# Patient Record
Sex: FEMALE | Race: OTHER | URBAN - METROPOLITAN AREA
[De-identification: names, ages, dates, MRNs, and addresses within clinical notes are randomized per-mention and may not be internally consistent; named-entity substitution may affect disease eponyms.]

---

## 2022-08-09 ENCOUNTER — TELEPHONE (OUTPATIENT)
Dept: FAMILY MEDICINE CLINIC | Facility: CLINIC | Age: 22
End: 2022-08-09

## 2022-08-09 NOTE — TELEPHONE ENCOUNTER
Patients mother calling to make a new patient appointment for gynecologicall visit with Dr. Mara Barrientos for tomorrow, she was told by Dr. Ruiz Mcneal was okay, could not verify with  at time of call, please advise.

## 2022-08-10 ENCOUNTER — OFFICE VISIT (OUTPATIENT)
Dept: FAMILY MEDICINE CLINIC | Facility: CLINIC | Age: 22
End: 2022-08-10
Payer: COMMERCIAL

## 2022-08-10 VITALS
WEIGHT: 129 LBS | RESPIRATION RATE: 18 BRPM | SYSTOLIC BLOOD PRESSURE: 102 MMHG | HEIGHT: 64 IN | HEART RATE: 97 BPM | BODY MASS INDEX: 22.02 KG/M2 | DIASTOLIC BLOOD PRESSURE: 66 MMHG | OXYGEN SATURATION: 99 %

## 2022-08-10 DIAGNOSIS — N89.8 VAGINAL DISCHARGE: Primary | ICD-10-CM

## 2022-08-10 LAB
APPEARANCE: CLEAR
BILIRUBIN: NEGATIVE
GLUCOSE (URINE DIPSTICK): NEGATIVE MG/DL
KETONES (URINE DIPSTICK): NEGATIVE MG/DL
LEUKOCYTES: NEGATIVE
MULTISTIX LOT#: ABNORMAL NUMERIC
NITRITE, URINE: NEGATIVE
PH, URINE: 5.5 (ref 4.5–8)
PROTEIN (URINE DIPSTICK): NEGATIVE MG/DL
SPECIFIC GRAVITY: 1.03 (ref 1–1.03)
URINE-COLOR: YELLOW
UROBILINOGEN,SEMI-QN: 0.2 MG/DL (ref 0–1.9)

## 2022-08-10 PROCEDURE — 81002 URINALYSIS NONAUTO W/O SCOPE: CPT | Performed by: FAMILY MEDICINE

## 2022-08-10 PROCEDURE — 3074F SYST BP LT 130 MM HG: CPT | Performed by: FAMILY MEDICINE

## 2022-08-10 PROCEDURE — 99213 OFFICE O/P EST LOW 20 MIN: CPT | Performed by: FAMILY MEDICINE

## 2022-08-10 PROCEDURE — 3008F BODY MASS INDEX DOCD: CPT | Performed by: FAMILY MEDICINE

## 2022-08-10 PROCEDURE — 3078F DIAST BP <80 MM HG: CPT | Performed by: FAMILY MEDICINE

## 2022-08-10 RX ORDER — DROSPIRENONE AND ETHINYL ESTRADIOL 0.02-3(28)
KIT ORAL
COMMUNITY

## 2022-08-11 LAB
APPEARANCE: CLEAR
BILIRUBIN: NEGATIVE
COLOR: YELLOW
GLUCOSE: NEGATIVE
KETONES: NEGATIVE
LEUKOCYTE ESTERASE: NEGATIVE
NITRITE: NEGATIVE
OCCULT BLOOD: NEGATIVE
PH: 5.5 (ref 5–8)
PROTEIN: NEGATIVE
SPECIFIC GRAVITY: 1.02 (ref 1–1.03)

## 2022-08-13 LAB
Lab: NOT DETECTED
Lab: NOT DETECTED
U. PARVUM DNA: NOT DETECTED
U. UREALYTICUM DNA: NOT DETECTED

## 2024-12-23 ENCOUNTER — LAB ENCOUNTER (OUTPATIENT)
Dept: LAB | Age: 24
End: 2024-12-23
Attending: FAMILY MEDICINE
Payer: COMMERCIAL

## 2024-12-23 ENCOUNTER — OFFICE VISIT (OUTPATIENT)
Dept: FAMILY MEDICINE CLINIC | Facility: CLINIC | Age: 24
End: 2024-12-23

## 2024-12-23 VITALS
OXYGEN SATURATION: 97 % | HEIGHT: 63.78 IN | BODY MASS INDEX: 22.99 KG/M2 | WEIGHT: 133 LBS | DIASTOLIC BLOOD PRESSURE: 84 MMHG | TEMPERATURE: 98 F | HEART RATE: 120 BPM | SYSTOLIC BLOOD PRESSURE: 119 MMHG

## 2024-12-23 DIAGNOSIS — Z00.00 ROUTINE PHYSICAL EXAMINATION: Primary | ICD-10-CM

## 2024-12-23 DIAGNOSIS — R00.2 PALPITATIONS: ICD-10-CM

## 2024-12-23 DIAGNOSIS — Z00.00 ROUTINE PHYSICAL EXAMINATION: ICD-10-CM

## 2024-12-23 DIAGNOSIS — E03.8 SUBCLINICAL HYPOTHYROIDISM: ICD-10-CM

## 2024-12-23 PROBLEM — F40.298 NEEDLE PHOBIA: Status: ACTIVE | Noted: 2024-12-23

## 2024-12-23 LAB
ANION GAP SERPL CALC-SCNC: 5 MMOL/L (ref 0–18)
ATRIAL RATE: 96 BPM
BUN BLD-MCNC: 10 MG/DL (ref 9–23)
BUN/CREAT SERPL: 12.7 (ref 10–20)
CALCIUM BLD-MCNC: 9.9 MG/DL (ref 8.7–10.4)
CHLORIDE SERPL-SCNC: 108 MMOL/L (ref 98–112)
CHOLEST SERPL-MCNC: 249 MG/DL (ref ?–200)
CO2 SERPL-SCNC: 23 MMOL/L (ref 21–32)
CREAT BLD-MCNC: 0.79 MG/DL
DEPRECATED RDW RBC AUTO: 39 FL (ref 35.1–46.3)
EGFRCR SERPLBLD CKD-EPI 2021: 108 ML/MIN/1.73M2 (ref 60–?)
ERYTHROCYTE [DISTWIDTH] IN BLOOD BY AUTOMATED COUNT: 11.9 % (ref 11–15)
FASTING PATIENT LIPID ANSWER: YES
FASTING STATUS PATIENT QL REPORTED: YES
GLUCOSE BLD-MCNC: 96 MG/DL (ref 70–99)
HCT VFR BLD AUTO: 39.9 %
HDLC SERPL-MCNC: 60 MG/DL (ref 40–59)
HGB BLD-MCNC: 13.9 G/DL
LDLC SERPL CALC-MCNC: 156 MG/DL (ref ?–100)
MCH RBC QN AUTO: 31.2 PG (ref 26–34)
MCHC RBC AUTO-ENTMCNC: 34.8 G/DL (ref 31–37)
MCV RBC AUTO: 89.7 FL
NONHDLC SERPL-MCNC: 189 MG/DL (ref ?–130)
OSMOLALITY SERPL CALC.SUM OF ELEC: 281 MOSM/KG (ref 275–295)
P AXIS: 68 DEGREES
P-R INTERVAL: 120 MS
PLATELET # BLD AUTO: 342 10(3)UL (ref 150–450)
POTASSIUM SERPL-SCNC: 4 MMOL/L (ref 3.5–5.1)
Q-T INTERVAL: 352 MS
QRS DURATION: 82 MS
QTC CALCULATION (BEZET): 444 MS
R AXIS: 66 DEGREES
RBC # BLD AUTO: 4.45 X10(6)UL
SODIUM SERPL-SCNC: 136 MMOL/L (ref 136–145)
T AXIS: 52 DEGREES
T4 FREE SERPL-MCNC: 1.5 NG/DL (ref 0.8–1.7)
TRIGL SERPL-MCNC: 184 MG/DL (ref 30–149)
TSI SER-ACNC: 5.82 UIU/ML (ref 0.55–4.78)
VENTRICULAR RATE: 96 BPM
VLDLC SERPL CALC-MCNC: 36 MG/DL (ref 0–30)
WBC # BLD AUTO: 8.5 X10(3) UL (ref 4–11)

## 2024-12-23 PROCEDURE — 84443 ASSAY THYROID STIM HORMONE: CPT

## 2024-12-23 PROCEDURE — 84439 ASSAY OF FREE THYROXINE: CPT

## 2024-12-23 PROCEDURE — 80061 LIPID PANEL: CPT

## 2024-12-23 PROCEDURE — 85027 COMPLETE CBC AUTOMATED: CPT

## 2024-12-23 PROCEDURE — 87491 CHLMYD TRACH DNA AMP PROBE: CPT

## 2024-12-23 PROCEDURE — 87591 N.GONORRHOEAE DNA AMP PROB: CPT

## 2024-12-23 PROCEDURE — 80048 BASIC METABOLIC PNL TOTAL CA: CPT

## 2024-12-23 PROCEDURE — 36415 COLL VENOUS BLD VENIPUNCTURE: CPT

## 2024-12-23 NOTE — PROGRESS NOTES
Subjective:   Patient ID: Alivia Sharpe is a 24 year old female.    Patient presents for routine physical.  Issues with palpitations as below.        History/Other:   Review of Systems   Constitutional: Negative.    Respiratory: Negative.     Cardiovascular:  Positive for palpitations.   Gastrointestinal: Negative.    Skin: Negative.    Neurological: Negative.      Current Outpatient Medications   Medication Sig Dispense Refill    Drospirenone-Ethinyl Estradiol (LUKE) 3-0.02 MG Oral Tab 0.3       Allergies:Allergies[1]    Objective:   Physical Exam  Constitutional:       Appearance: Normal appearance.   Cardiovascular:      Rate and Rhythm: Normal rate and regular rhythm.      Heart sounds: Normal heart sounds.   Pulmonary:      Effort: Pulmonary effort is normal.      Breath sounds: Normal breath sounds.   Chest:   Breasts:     Right: Normal. No mass.      Left: Normal. No mass.   Abdominal:      Palpations: Abdomen is soft. There is no mass.      Tenderness: There is no abdominal tenderness.   Lymphadenopathy:      Cervical: No cervical adenopathy.      Upper Body:      Right upper body: No axillary adenopathy.      Left upper body: No axillary adenopathy.   Skin:     General: Skin is warm and dry.   Neurological:      Mental Status: She is alert and oriented to person, place, and time.     EKG  Narrative  Performed by: [facility]  Normal sinus rhythm  Nonspecific ST abnormality    No previous ECGs found in [facility]  Confirmed by Ananya Palma ([id number]) on 12/23/2024 5:08:05 P       Assessment & Plan:   1. Routine physical examination-patient is single, attracted to men, menses regular, medium-light flow every 7 weeks with consecutive packs OCPs.  Living and working [place], [place] from.  Exercising regularly with treadmill/workout room.  Commutes.  Discussed STD prevention  Discussed EtOH moderation.  No drugs/nicotine.   2. Palpitations-for the past 3-4 weeks has had intermittent palpitations, better but not resolved the past 1 to 2  weeks.  They are generally brief, associated with brief cough/tightness at base of anterior neck.  No dyspnea, near-syncope, syncope, chest pain.  Last summer she had had increased headaches and brain fog, this resolved after starting MVI.  Has had COVID x 4.  Not associated with caffeine or EtOH.  No worrisome features, rule out PVC/PACs, thyroid disease.  Family history thyroid disease.  EKG done today  Labs as ordered.       Orders Placed This Encounter   Procedures    Lipid Panel [E]    CBC, Platelet, No Differential [E]    Basic Metabolic Panel (8) [E]    TSH W Reflex To Free T4 [E]    Chlamydia/Gc Amplification [E]       Meds This Visit:  Requested Prescriptions      No prescriptions requested or ordered in this encounter       Imaging & Referrals:  ELECTROCARDIOGRAM, COMPLETE         [1] No Known Allergies

## 2024-12-24 LAB
C TRACH DNA SPEC QL NAA+PROBE: NEGATIVE
N GONORRHOEA DNA SPEC QL NAA+PROBE: NEGATIVE

## 2025-06-26 ENCOUNTER — OFFICE VISIT (OUTPATIENT)
Dept: FAMILY MEDICINE CLINIC | Facility: CLINIC | Age: 25
End: 2025-06-26
Payer: COMMERCIAL

## 2025-06-26 ENCOUNTER — LAB ENCOUNTER (OUTPATIENT)
Dept: LAB | Age: 25
End: 2025-06-26
Attending: FAMILY MEDICINE
Payer: COMMERCIAL

## 2025-06-26 VITALS
BODY MASS INDEX: 23 KG/M2 | OXYGEN SATURATION: 98 % | HEART RATE: 96 BPM | WEIGHT: 135 LBS | DIASTOLIC BLOOD PRESSURE: 85 MMHG | SYSTOLIC BLOOD PRESSURE: 129 MMHG

## 2025-06-26 DIAGNOSIS — E03.8 SUBCLINICAL HYPOTHYROIDISM: Primary | ICD-10-CM

## 2025-06-26 DIAGNOSIS — R00.2 PALPITATIONS: ICD-10-CM

## 2025-06-26 DIAGNOSIS — E03.8 SUBCLINICAL HYPOTHYROIDISM: ICD-10-CM

## 2025-06-26 LAB
T3 SERPL-MCNC: 2.13 NG/ML (ref 0.6–1.81)
T4 FREE SERPL-MCNC: 1.3 NG/DL (ref 0.8–1.7)
THYROGLOB SERPL-MCNC: 29 U/ML (ref ?–60)
THYROPEROXIDASE AB SERPL-ACNC: <28 U/ML (ref ?–60)
TSI SER-ACNC: 6.09 UIU/ML (ref 0.55–4.78)

## 2025-06-26 PROCEDURE — 36415 COLL VENOUS BLD VENIPUNCTURE: CPT

## 2025-06-26 PROCEDURE — 86800 THYROGLOBULIN ANTIBODY: CPT

## 2025-06-26 PROCEDURE — 84480 ASSAY TRIIODOTHYRONINE (T3): CPT

## 2025-06-26 PROCEDURE — 86376 MICROSOMAL ANTIBODY EACH: CPT

## 2025-06-26 PROCEDURE — 99213 OFFICE O/P EST LOW 20 MIN: CPT | Performed by: FAMILY MEDICINE

## 2025-06-26 PROCEDURE — 84443 ASSAY THYROID STIM HORMONE: CPT

## 2025-06-26 PROCEDURE — 84439 ASSAY OF FREE THYROXINE: CPT

## 2025-06-26 RX ORDER — DROSPIRENONE AND ETHINYL ESTRADIOL 0.03MG-3MG
1 KIT ORAL DAILY
COMMUNITY
Start: 2025-05-14

## 2025-06-26 NOTE — PROGRESS NOTES
The following individual(s) verbally consented to be recorded using ambient AI listening technology and understand that they can each withdraw their consent to this listening technology at any point by asking the clinician to turn off or pause the recording:    Patient name: Alivia Sharpe  Additional names:

## 2025-06-26 NOTE — PROGRESS NOTES
Subjective:   Alivia Sharpe is a 24 year old female who presents for Follow - Up (6 month thyroid F/u. )       History/Other:   History of Present Illness  Alivia Sharpe is a 24 year old female who presents for follow-up on subclinical hypothyroidism and with chest palpitations and difficulty losing weight.    She continues to experience chest palpitations but less frequently., described as a brief fast heart rate. She struggles with weight loss despite a healthy diet and regular exercise three to four times a week. Previous tests showed elevated TSH with normal thyroid hormone levels. Her father has thyroid issues. She works hybrid.  No concerns about sexually transmitted diseases or other significant symptoms.      Chief Complaint Reviewed and Verified  Nursing Notes Reviewed and   Verified  Tobacco Reviewed  Allergies Reviewed  Medications Reviewed    Problem List Reviewed  OB Status Reviewed         Tobacco:  She has never smoked tobacco.    Current Medications[1]           Review of Systems:  See above      Objective:   /85   Pulse 96   Wt 135 lb (61.2 kg)   SpO2 98%   BMI 23.33 kg/m²    Estimated body mass index is 23.33 kg/m² as calculated from the following:    Height as of 12/23/24: 5' 3.78\" (1.62 m).    Weight as of this encounter: 135 lb (61.2 kg).  Results  RADIOLOGY  No results found.       Physical Exam  Physical Exam  GENERAL: Normal appearance  NECK: Neck supple, non-tender, no thyromegaly  CHEST: Clear to auscultation bilaterally  CARDIOVASCULAR: Regular rate and rhythm      Assessment & Plan:   1. Subclinical hypothyroidism (Primary)  2. Palpitations      Assessment & Plan  Assessment & Plan  Subclinical Hypothyroidism  Elevated TSH with normal thyroid hormone levels. Family history increases risk. Current guidelines do not recommend treatment unless hormone levels are abnormal.  - Recheck thyroid function tests, including TSH and T4.  - Check thyroid antibodies to assess risk of  developing hypothyroidism.    Palpitations  Palpitations occur less frequently, particularly with activity or after alcohol. Not considered dangerous. Further monitoring available if desired.  - Offer Zio patch for two weeks of monitoring if she decides to evaluate palpitations further.    General Health Maintenance  Maintains healthy lifestyle with regular exercise and diet.  - Encourage regular physical activity, especially on sedentary workdays.            Return in about 6 months (around 12/26/2025) for Routine physical.      Ananya Palma MD              [1]   Current Outpatient Medications   Medication Sig Dispense Refill    drospirenone-ethinyl estradiol 3-0.03 MG Oral Tab Take 1 tablet by mouth daily.

## 2025-06-30 ENCOUNTER — TELEPHONE (OUTPATIENT)
Dept: ENDOCRINOLOGY CLINIC | Facility: CLINIC | Age: 25
End: 2025-06-30

## 2025-06-30 NOTE — TELEPHONE ENCOUNTER
----- Message from Ananya ACKERMANToni Louie sent at 6/29/2025  5:53 PM CDT -----  Regarding: Appt in August  Lucian Mcdonald-  Could I ask you to have your office reach out and schedule this new patient when she is home from [facility] in August?  Her father is a family physician who has Hashimoto's hypothyroidism.  Her lab pattern was a little unusual, but she is essentially asymptomatic.  Thank you!

## 2025-08-01 ENCOUNTER — LAB ENCOUNTER (OUTPATIENT)
Dept: LAB | Facility: HOSPITAL | Age: 25
End: 2025-08-01
Attending: INTERNAL MEDICINE

## 2025-08-01 ENCOUNTER — OFFICE VISIT (OUTPATIENT)
Dept: ENDOCRINOLOGY CLINIC | Facility: CLINIC | Age: 25
End: 2025-08-01

## 2025-08-01 VITALS
HEIGHT: 63.78 IN | DIASTOLIC BLOOD PRESSURE: 87 MMHG | BODY MASS INDEX: 23.85 KG/M2 | HEART RATE: 88 BPM | WEIGHT: 138 LBS | SYSTOLIC BLOOD PRESSURE: 127 MMHG

## 2025-08-01 DIAGNOSIS — E03.8 SUBCLINICAL HYPOTHYROIDISM: ICD-10-CM

## 2025-08-01 DIAGNOSIS — E03.8 SUBCLINICAL HYPOTHYROIDISM: Primary | ICD-10-CM

## 2025-08-01 LAB
T3FREE SERPL-MCNC: 3.49 PG/ML (ref 2.4–4.2)
T4 FREE SERPL-MCNC: 1.3 NG/DL (ref 0.8–1.7)
TSI SER-ACNC: 9.06 UIU/ML (ref 0.55–4.78)

## 2025-08-01 PROCEDURE — 99202 OFFICE O/P NEW SF 15 MIN: CPT | Performed by: INTERNAL MEDICINE

## 2025-08-01 PROCEDURE — 84439 ASSAY OF FREE THYROXINE: CPT

## 2025-08-01 PROCEDURE — 84443 ASSAY THYROID STIM HORMONE: CPT

## 2025-08-01 PROCEDURE — 84481 FREE ASSAY (FT-3): CPT

## 2025-08-01 PROCEDURE — 36415 COLL VENOUS BLD VENIPUNCTURE: CPT

## 2025-08-07 ENCOUNTER — PATIENT MESSAGE (OUTPATIENT)
Dept: ENDOCRINOLOGY CLINIC | Facility: CLINIC | Age: 25
End: 2025-08-07

## 2025-08-07 DIAGNOSIS — E03.8 SUBCLINICAL HYPOTHYROIDISM: Primary | ICD-10-CM

## 2025-08-07 RX ORDER — LEVOTHYROXINE SODIUM 25 UG/1
25 TABLET ORAL
Qty: 90 TABLET | Refills: 0 | Status: SHIPPED | OUTPATIENT
Start: 2025-08-07